# Patient Record
Sex: MALE | Race: WHITE | NOT HISPANIC OR LATINO | Employment: FULL TIME | URBAN - METROPOLITAN AREA
[De-identification: names, ages, dates, MRNs, and addresses within clinical notes are randomized per-mention and may not be internally consistent; named-entity substitution may affect disease eponyms.]

---

## 2019-06-07 ENCOUNTER — OFFICE VISIT (OUTPATIENT)
Dept: FAMILY MEDICINE | Facility: CLINIC | Age: 24
End: 2019-06-07
Payer: COMMERCIAL

## 2019-06-07 VITALS
OXYGEN SATURATION: 98 % | DIASTOLIC BLOOD PRESSURE: 68 MMHG | HEIGHT: 73 IN | TEMPERATURE: 97.7 F | SYSTOLIC BLOOD PRESSURE: 125 MMHG | BODY MASS INDEX: 25.59 KG/M2 | WEIGHT: 193.1 LBS | HEART RATE: 60 BPM

## 2019-06-07 DIAGNOSIS — R04.0 EPISTAXIS: Primary | ICD-10-CM

## 2019-06-07 ASSESSMENT — MIFFLIN-ST. JEOR: SCORE: 1919.78

## 2019-06-07 ASSESSMENT — PAIN SCALES - GENERAL: PAINLEVEL: NO PAIN (0)

## 2019-06-07 NOTE — PATIENT INSTRUCTIONS
Plan:    Nose bleeds - Daily nasal saline 2-4  sprays per nostril at least 3 times a day. Can also try sinus wash (other the counter bottle kit) daily or at least weekly. Start taking loratadine 10 mg daily (Claritin). At night before bed AYR nasal gel. All of this is available from drug stores. If you still have nosebleeds in 4 weeks then we can do blood work and refer you to ENT.     Recommend annual physical sometime before the end of the year - we can check fasting blood labs at that time too.       [facility] Medication Refill Request Information:  * Please contact your pharmacy regarding ANY request for medication refills.  ** [facility] Clinic Prescription Fax = [phone]  * Please allow 3 business days for routine medication refills.  * Please allow 5 business days for controlled substance medication refills.     [facility] Test Result notification information:  *You will be notified with in 7-10 days of your appointment day regarding the results of your test.  If you are on MyChart you will be notified as soon as the provider has reviewed the results and signed off on them.    [facility]: [phone]

## 2019-06-07 NOTE — NURSING NOTE
"24 year old  Chief Complaint   Patient presents with     Establish Care     Pt is here to establish care     Nose Bleeds     Pt is having frequent nose bleeding.        Blood pressure 125/68, pulse 60, temperature 97.7  F (36.5  C), height 1.854 m (6' 1\"), weight 87.6 kg (193 lb 1.6 oz), SpO2 98 %. Body mass index is 25.48 kg/m .  BP completed using cuff size:    There is no problem list on file for this patient.      Wt Readings from Last 2 Encounters:   06/07/19 87.6 kg (193 lb 1.6 oz)     BP Readings from Last 3 Encounters:   06/07/19 125/68       Allergies   Allergen Reactions     Seasonal Allergies      Congestion  Sneezing       No current outpatient medications on file.     No current facility-administered medications for this visit.        Social History     Tobacco Use     Smoking status: Never Smoker     Smokeless tobacco: Never Used   Substance Use Topics     Alcohol use: None     Drug use: None       Honoring Choices - Health Care Directive Guide offered to patient at time of visit.    Health Maintenance Due   Topic Date Due     PREVENTIVE CARE VISIT  1995     DTAP/TDAP/TD IMMUNIZATION (1 - Tdap) 05/28/2002     HIV SCREENING  05/28/2010     PHQ-2  01/01/2019         There is no immunization history on file for this patient.    No results found for: PAP      No lab results found.    PHQ-2 ( 1999 Pfizer) 6/7/2019   Q1: Little interest or pleasure in doing things 0   Q2: Feeling down, depressed or hopeless 0   PHQ-2 Score 0       No flowsheet data found.    No flowsheet data found.    No flowsheet data found.      Rehana Quintana, [facility]  June 7, 2019 5:32 PM  "

## 2019-06-07 NOTE — PROGRESS NOTES
"SUBJECTIVE:  Star Gonzalez is a 24 year old male presents for   Chief Complaint   Patient presents with     Establish Care     Pt is here to establish care     Nose Bleeds     Pt is having frequent nose bleeding.         HPI  Star is here today to establish care and has a concern of nose bleeds. He has suffered with them off and on his whole life. However, the past few weeks he has been getting them more frequently like 1-2 times a week. They don't last long, just a few min. He does mention runny nose off and on with allergy symptoms. No trauma to nose. Denies pain. No home treatment has been done. Denies feeling faint.     Review Of Systems  Skin: negative  Eyes: negative  Ears/Nose/Throat: as above  Respiratory: No shortness of breath, dyspnea on exertion, cough, or hemoptysis  Cardiovascular: negative      Medications and allergies were reviewed by me today.   PMH/PSH and Social History Reviewed per EMR.    Current Medications  No current outpatient medications on file.       Allergies  Allergies   Allergen Reactions     Seasonal Allergies      Congestion  Sneezing         OBJECTIVE:    Physical Exam  /68   Pulse 60   Temp 97.7  F (36.5  C)   Ht 1.854 m (6' 1\")   Wt 87.6 kg (193 lb 1.6 oz)   SpO2 98%   BMI 25.48 kg/m      Exam:  Constitutional: healthy, alert and no distress  Head: Normocephalic. No masses, lesions, tenderness or abnormalities  Neck: Neck supple. No adenopathy. Thyroid symmetric, normal size,, Carotids without bruits.  ENT: Nasal mucosa dry appearing with erythema bilat nostrils, otherwise ENT exam normal, no neck nodes or sinus tenderness  Cardiovascular: negative, PMI normal. No lifts, heaves, or thrills. RRR. No murmurs, clicks gallops or rub  Respiratory: negative. Good diaphragmatic excursion. Lungs clear  Skin: no suspicious lesions or rashes  Neurologic: Gait normal. Sensation grossly WNL.  Psychiatric: mentation appears normal and affect " normal/bright  Hematologic/Lymphatic/Immunologic: Normal cervical lymph nodes      ASSESSMENT/PLAN:  Nose bleeds - Daily nasal saline 2-4  sprays per nostril at least 3 times a day. Can also try sinus wash (other the counter bottle kit) daily or at least weekly. Start taking loratadine 10 mg daily (Claritin). At night before bed AYR nasal gel. All of this is available from drug stores. If you still have nosebleeds in 4 weeks then we can do blood work and refer you to ENT.     Recommend annual physical sometime before the end of the year - we can check fasting blood labs at that time too.       Zabrina Schmidt DNP, APRN, CNP

## 2020-01-17 DIAGNOSIS — Z11.3 SCREEN FOR STD (SEXUALLY TRANSMITTED DISEASE): Primary | ICD-10-CM

## 2020-01-18 DIAGNOSIS — Z11.3 SCREEN FOR STD (SEXUALLY TRANSMITTED DISEASE): ICD-10-CM

## 2020-01-18 LAB
HBV SURFACE AG SERPL QL IA: NONREACTIVE
HCV AB SERPL QL IA: NONREACTIVE
HIV 1+2 AB+HIV1 P24 AG SERPL QL IA: NONREACTIVE

## 2020-01-19 LAB
N GONORRHOEA DNA SPEC QL NAA+PROBE: NEGATIVE
SPECIMEN SOURCE: NORMAL
T PALLIDUM AB SER QL: NONREACTIVE

## 2020-03-11 ENCOUNTER — HEALTH MAINTENANCE LETTER (OUTPATIENT)
Age: 25
End: 2020-03-11

## 2020-05-15 DIAGNOSIS — Z20.2 POTENTIAL EXPOSURE TO STD: ICD-10-CM

## 2020-05-17 LAB
C TRACH DNA SPEC QL NAA+PROBE: NEGATIVE
N GONORRHOEA DNA SPEC QL NAA+PROBE: NEGATIVE
SPECIMEN SOURCE: NORMAL
SPECIMEN SOURCE: NORMAL

## 2021-01-03 ENCOUNTER — HEALTH MAINTENANCE LETTER (OUTPATIENT)
Age: 26
End: 2021-01-03

## 2021-01-22 ENCOUNTER — VIRTUAL VISIT (OUTPATIENT)
Dept: INTERNAL MEDICINE | Facility: CLINIC | Age: 26
End: 2021-01-22
Payer: COMMERCIAL

## 2021-01-22 ENCOUNTER — MYC MEDICAL ADVICE (OUTPATIENT)
Dept: INTERNAL MEDICINE | Facility: CLINIC | Age: 26
End: 2021-01-22

## 2021-01-22 DIAGNOSIS — Z72.51 HIGH RISK SEXUAL BEHAVIOR, UNSPECIFIED TYPE: ICD-10-CM

## 2021-01-22 DIAGNOSIS — Z79.899 ON PRE-EXPOSURE PROPHYLAXIS FOR HIV: Primary | ICD-10-CM

## 2021-01-22 DIAGNOSIS — J30.2 SEASONAL ALLERGIC RHINITIS, UNSPECIFIED TRIGGER: ICD-10-CM

## 2021-01-22 DIAGNOSIS — Z00.00 PREVENTATIVE HEALTH CARE: ICD-10-CM

## 2021-01-22 DIAGNOSIS — Z79.899 ON PRE-EXPOSURE PROPHYLAXIS FOR HIV: ICD-10-CM

## 2021-01-22 LAB
ALBUMIN SERPL-MCNC: 4.4 G/DL (ref 3.4–5)
ALP SERPL-CCNC: 35 U/L (ref 40–150)
ALT SERPL W P-5'-P-CCNC: 46 U/L (ref 0–70)
ANION GAP SERPL CALCULATED.3IONS-SCNC: 6 MMOL/L (ref 3–14)
AST SERPL W P-5'-P-CCNC: 25 U/L (ref 0–45)
BASOPHILS # BLD AUTO: 0 10E9/L (ref 0–0.2)
BASOPHILS NFR BLD AUTO: 0.3 %
BILIRUB SERPL-MCNC: 0.5 MG/DL (ref 0.2–1.3)
BUN SERPL-MCNC: 19 MG/DL (ref 7–30)
CALCIUM SERPL-MCNC: 9.3 MG/DL (ref 8.5–10.1)
CHLORIDE SERPL-SCNC: 103 MMOL/L (ref 94–109)
CO2 SERPL-SCNC: 28 MMOL/L (ref 20–32)
CREAT SERPL-MCNC: 0.94 MG/DL (ref 0.66–1.25)
DIFFERENTIAL METHOD BLD: NORMAL
EOSINOPHIL # BLD AUTO: 0.1 10E9/L (ref 0–0.7)
EOSINOPHIL NFR BLD AUTO: 0.9 %
ERYTHROCYTE [DISTWIDTH] IN BLOOD BY AUTOMATED COUNT: 11.4 % (ref 10–15)
GFR SERPL CREATININE-BSD FRML MDRD: >90 ML/MIN/{1.73_M2}
GLUCOSE SERPL-MCNC: 100 MG/DL (ref 70–99)
HBV SURFACE AB SERPL IA-ACNC: 63.11 M[IU]/ML
HBV SURFACE AG SERPL QL IA: NONREACTIVE
HCT VFR BLD AUTO: 46.6 % (ref 40–53)
HCV AB SERPL QL IA: NONREACTIVE
HGB BLD-MCNC: 16.3 G/DL (ref 13.3–17.7)
HIV 1+2 AB+HIV1 P24 AG SERPL QL IA: NONREACTIVE
IMM GRANULOCYTES # BLD: 0 10E9/L (ref 0–0.4)
IMM GRANULOCYTES NFR BLD: 0.2 %
LYMPHOCYTES # BLD AUTO: 3.5 10E9/L (ref 0.8–5.3)
LYMPHOCYTES NFR BLD AUTO: 33.3 %
MCH RBC QN AUTO: 29.4 PG (ref 26.5–33)
MCHC RBC AUTO-ENTMCNC: 35 G/DL (ref 31.5–36.5)
MCV RBC AUTO: 84 FL (ref 78–100)
MONOCYTES # BLD AUTO: 0.7 10E9/L (ref 0–1.3)
MONOCYTES NFR BLD AUTO: 6.9 %
NEUTROPHILS # BLD AUTO: 6.2 10E9/L (ref 1.6–8.3)
NEUTROPHILS NFR BLD AUTO: 58.4 %
NRBC # BLD AUTO: 0 10*3/UL
NRBC BLD AUTO-RTO: 0 /100
PLATELET # BLD AUTO: 257 10E9/L (ref 150–450)
POTASSIUM SERPL-SCNC: 3.4 MMOL/L (ref 3.4–5.3)
PROT SERPL-MCNC: 8.3 G/DL (ref 6.8–8.8)
RBC # BLD AUTO: 5.54 10E12/L (ref 4.4–5.9)
SODIUM SERPL-SCNC: 137 MMOL/L (ref 133–144)
WBC # BLD AUTO: 10.5 10E9/L (ref 4–11)

## 2021-01-22 PROCEDURE — 86780 TREPONEMA PALLIDUM: CPT | Mod: 90 | Performed by: PATHOLOGY

## 2021-01-22 PROCEDURE — 87491 CHLMYD TRACH DNA AMP PROBE: CPT | Mod: 90 | Performed by: PATHOLOGY

## 2021-01-22 PROCEDURE — 85025 COMPLETE CBC W/AUTO DIFF WBC: CPT | Performed by: PATHOLOGY

## 2021-01-22 PROCEDURE — 36415 COLL VENOUS BLD VENIPUNCTURE: CPT | Performed by: PATHOLOGY

## 2021-01-22 PROCEDURE — 87591 N.GONORRHOEAE DNA AMP PROB: CPT | Mod: 90 | Performed by: PATHOLOGY

## 2021-01-22 PROCEDURE — 86706 HEP B SURFACE ANTIBODY: CPT | Mod: 90 | Performed by: PATHOLOGY

## 2021-01-22 PROCEDURE — 87340 HEPATITIS B SURFACE AG IA: CPT | Mod: 90 | Performed by: PATHOLOGY

## 2021-01-22 PROCEDURE — 87389 HIV-1 AG W/HIV-1&-2 AB AG IA: CPT | Mod: 90 | Performed by: PATHOLOGY

## 2021-01-22 PROCEDURE — 86803 HEPATITIS C AB TEST: CPT | Mod: 90 | Performed by: PATHOLOGY

## 2021-01-22 PROCEDURE — 99203 OFFICE O/P NEW LOW 30 MIN: CPT | Mod: 95 | Performed by: PEDIATRICS

## 2021-01-22 PROCEDURE — 80053 COMPREHEN METABOLIC PANEL: CPT | Performed by: PATHOLOGY

## 2021-01-22 RX ORDER — EMTRICITABINE AND TENOFOVIR DISOPROXIL FUMARATE 200; 300 MG/1; MG/1
1 TABLET, FILM COATED ORAL DAILY
Qty: 90 TABLET | Refills: 3 | Status: SHIPPED | OUTPATIENT
Start: 2021-01-22 | End: 2021-04-21

## 2021-01-22 NOTE — NURSING NOTE
Chief Complaint   Patient presents with     Establish Care     Medication Follow-up     Video Visit Technology for this patient: Lan not working, patient has smart device, please try Doximity Video with patient     .Harriet Velez LPN at 9:28 AM on 1/22/2021.

## 2021-01-22 NOTE — PATIENT INSTRUCTIONS
If you have questions regarding Covid-19 and the Covid-19 vaccine, please visit this website.    [email or web address]

## 2021-01-22 NOTE — PROGRESS NOTES
"Dear patient. I use the medical record to document (to the best of my ability) my understanding of:   - What you told me,  - Relevant details from my exam, records review, and/or test results, and  - My assessment and plan  If you have questions, you are welcome to contact me; I will reply as soon as time allows.      Virtual Visit Details    Type of service:  Video Visit (some confusion about vehicle, ended up on Doximity via phone)    Start Time: 10:16 AM    End Time:10:34 AM    Status: new patient visit in my panel  Visit type: evaluation & management of one or more problems  Time note ((n3, 30'): The total time (on the date of service) for this service was 30 minutes, including discussion/face-to-face, chart review, interpretation not otherwise reported, documentation, and updating of the computerized record.    Originating Location (pt. Location): home    Distant Location (provider location):  [facility] [place]     Platform used for Visit: [employer]      Shanti    Star Gonzalez is a 25 year old man, with concerns including:  Chief Complaint   Patient presents with     [facility]     Medication Follow-up       History, update, and/or problems    Patient wishes to be started on PrEP (pre-exposure prophylaxis against HIV infection, for persons at risk for exposure). He is not aware of having sexual contact with HIV positive persons. The last time he was tested in May. He has only had 2 partners since then and both are on PrEP. He has had a total of 10 partners. He has never had any other STD.    He has not had any kidney problems in the past.    Counseled about safe sex and guidelines for PrEP. Will do testing and the prescription.    ADDENDUM:  I will send the following recommendations via [employer].    -----------------------------------------------------------------------------------  Recommendations for Pre-Exposure Prophylaxis (\"PrEP\")  Against HIV " infection  -----------------------------------------------------------------------------------       To help prevent HIV infection, I prescribed a daily pill of tenofovir disoproxil fumarate-emtricitabine (brand name Truvada). Fang to you, for being keeping your risk in mind.    To be on PrEP, we have to keep 3 things in mind:  -----------------------------------------------------------------  1. You are still at risk for HIV - PrEP isn't 100% effective.       - Take the pill every day. If this is difficult, please let me know (there may be other things we can do)       - Please use a condom anyway.       - We should recheck your HIV status every 3 months. If you end up getting HIV, we will need to change your treatment around.    2. Monitor for side effects, with blood tests for kidney function. We may also need to do testing for bone health.    3. Try to keep the lines of communication open with your sexual partners. And with me! It isn't always easy to bring these things up with a doctor, but I'm glad you did.     Guidelines recommend the following:  ----------------------------------------------------  - Check a blood test for kidney function 1 months after starting PrEP, and again 3 months after starting PrEP.  - Check again every 6 months. If you have any other risk for kidney problems, we may need to check every 3 months.  - Testing for HIV _every 3 months_.  - Regular testing for sexually transmitted infections (depending on you and your partners' risk).  - Check in with me every 3 months - I'm supposed to verify that you are (a) taking the pill every day, (b) reminded about using a condom, and (c) aware of ways to reduce your sexual risk. I appreciate your patience with this recommendation.     PrEP can also increase your risk for osteoporosis (under-calcified bones). Keep up your calcium intake and weight-bearing physical activity. At our regular visits, we should assess for risk of osteoporosis.    To  "facilitate the above tests, I entered a \"standing order\" for HIV and kidney testing. For lab tests, please call [phone]. Currently, our lab isn't allowing walk-ins (because of COVID and social distancing requirements).   When you get the test, please send me a message to get your refills. Remember it can take several days to turn a request around.                Seasonal allergies, sometimes may take an allergy pill if really bad.      Scheduled to get COVID vaccination this weekend.            Preventive health plans  At Mr. Gonzalez's age, I recommend the following:      - A one-time \"full\" history and physical, with creation of a complete electronic medical record. He will ask about family history prior to that.      - A return visit every 36 months to maintain the medical record and relationship. Visiting more often is welcome, if I am needed.      - Assessment of cardiovascular risk, including adult-age lipid screening (fasting). Counseling about reducing risk can be done, based on history and lipids.   The above goals are part of the \"prevention\" services that are free for many insurances.      - Fasting blood sugar. In theory this can be done every year, but men with normal weight and no family history can be screened every 2-3 years (or less often, in some cases).      - Annual blood pressure check. This can be done here, or by any competent reader.    Remember: most insurances distinguish between \"preventive\" and \"problem-based\" visits. We don't have much control over what your insurance does.  If you have a symptom or other medical concern, it may be cheaper for you to have prevention and the problems addressed in separate visits.          General comments    Review of Systems    As part of this encounter, I reviewed (and updated as appropriate) the past medical, family, and social history.      Physical exam as possible  Physical Exam         "

## 2021-01-23 PROBLEM — Z72.51 HIGH RISK SEXUAL BEHAVIOR, UNSPECIFIED TYPE: Status: ACTIVE | Noted: 2021-01-23

## 2021-01-23 PROBLEM — Z79.899 ON PRE-EXPOSURE PROPHYLAXIS FOR HIV: Status: ACTIVE | Noted: 2021-01-23

## 2021-01-23 PROBLEM — J30.2 SEASONAL ALLERGIC RHINITIS: Status: ACTIVE | Noted: 2021-01-23

## 2021-01-23 PROBLEM — Z00.00 PREVENTATIVE HEALTH CARE: Status: ACTIVE | Noted: 2021-01-23

## 2021-01-23 PROBLEM — H52.7 REFRACTIVE ERROR: Status: ACTIVE | Noted: 2021-01-23

## 2021-01-23 LAB
C TRACH DNA SPEC QL NAA+PROBE: NEGATIVE
N GONORRHOEA DNA SPEC QL NAA+PROBE: NEGATIVE
SPECIMEN SOURCE: NORMAL
SPECIMEN SOURCE: NORMAL
T PALLIDUM AB SER QL: NONREACTIVE

## 2021-01-23 NOTE — TELEPHONE ENCOUNTER
Testing  2/22/21: creatinine, vitamin D  4/22/21: creatinine, HIV  Thereafter, testing can be done every 3 months

## 2021-04-19 ENCOUNTER — TELEPHONE (OUTPATIENT)
Dept: INTERNAL MEDICINE | Facility: CLINIC | Age: 26
End: 2021-04-19

## 2021-04-19 DIAGNOSIS — Z72.51 HIGH RISK SEXUAL BEHAVIOR, UNSPECIFIED TYPE: ICD-10-CM

## 2021-04-19 DIAGNOSIS — Z79.899 ON PRE-EXPOSURE PROPHYLAXIS FOR HIV: Primary | ICD-10-CM

## 2021-04-19 NOTE — TELEPHONE ENCOUNTER
[facility] Call Center    Phone Message    May a detailed message be left on voicemail: yes , Patient is wanting to get a call back when this has been done to be able to schedule a lab apt. Please advise.     Reason for Call: Order(s): Other:     Reason for requested: Lab work for medication    Date needed: asap    Provider name: Mitch      Action Taken: Message routed to:  [facility]    Travel Screening: Not Applicable

## 2021-04-20 DIAGNOSIS — Z79.899 ON PRE-EXPOSURE PROPHYLAXIS FOR HIV: ICD-10-CM

## 2021-04-20 DIAGNOSIS — Z72.51 HIGH RISK SEXUAL BEHAVIOR, UNSPECIFIED TYPE: ICD-10-CM

## 2021-04-20 LAB
CREAT SERPL-MCNC: 0.99 MG/DL (ref 0.66–1.25)
GFR SERPL CREATININE-BSD FRML MDRD: >90 ML/MIN/{1.73_M2}

## 2021-04-20 PROCEDURE — 87389 HIV-1 AG W/HIV-1&-2 AB AG IA: CPT | Mod: 90 | Performed by: PATHOLOGY

## 2021-04-20 PROCEDURE — 82306 VITAMIN D 25 HYDROXY: CPT | Mod: 90 | Performed by: PATHOLOGY

## 2021-04-20 PROCEDURE — 36415 COLL VENOUS BLD VENIPUNCTURE: CPT | Performed by: PATHOLOGY

## 2021-04-20 PROCEDURE — 82565 ASSAY OF CREATININE: CPT | Performed by: PATHOLOGY

## 2021-04-20 NOTE — TELEPHONE ENCOUNTER
Call patient. Labs already placed. Patient schedule for today at 5:00 PM per patient.    Maribell Munson, Clinic Coordinator, April 20, 2021 at 9:23 AM

## 2021-04-21 ENCOUNTER — MYC MEDICAL ADVICE (OUTPATIENT)
Dept: INTERNAL MEDICINE | Facility: CLINIC | Age: 26
End: 2021-04-21

## 2021-04-21 DIAGNOSIS — Z79.899 ON PRE-EXPOSURE PROPHYLAXIS FOR HIV: ICD-10-CM

## 2021-04-21 LAB
DEPRECATED CALCIDIOL+CALCIFEROL SERPL-MC: 33 UG/L (ref 20–75)
HIV 1+2 AB+HIV1 P24 AG SERPL QL IA: NONREACTIVE

## 2021-04-21 RX ORDER — EMTRICITABINE AND TENOFOVIR DISOPROXIL FUMARATE 200; 300 MG/1; MG/1
1 TABLET, FILM COATED ORAL DAILY
Qty: 90 TABLET | Refills: 0 | Status: SHIPPED | OUTPATIENT
Start: 2021-04-21 | End: 2021-04-21

## 2021-04-21 RX ORDER — EMTRICITABINE AND TENOFOVIR DISOPROXIL FUMARATE 200; 300 MG/1; MG/1
1 TABLET, FILM COATED ORAL DAILY
Qty: 90 TABLET | Refills: 0 | Status: SHIPPED | OUTPATIENT
Start: 2021-04-21 | End: 2021-05-26

## 2021-04-21 NOTE — TELEPHONE ENCOUNTER
Reviewed PrEP protocol, updated:  PrEP protocol  Medication: daily Truvada  Next appt 5/11/21. Verify daily dosing, awareness of condom usage.  Next routine due: 7/22/2021 HIV, syphilis, urine G&C  Again testing: 10/22/2021 HIV, creat  Baseline creatinine 0.94 (1/22/2021), physically active

## 2021-04-21 NOTE — TELEPHONE ENCOUNTER
Pharmacy called, due to patient being on Truvada for PeEP, the patient needs to have lab work every 3 months, therefore the past Rx that was placed on 1/22/21 for 90 days and 3 refills is not valid. New rx was placed as patient did get new updated lab work and provider resulted on lab work.     Will send message to provider if he would like to have standing order for lab work for every 3 months. Orders pended this encounter. Isidra Mitchell LPN 4/21/2021 3:39 PM

## 2021-04-22 NOTE — TELEPHONE ENCOUNTER
"The HIV was already done again. but the syphilis and urine GC only need to be done every 6 months, unless the patient is said to have \"high risk behaviors\" or is symptomatic.   PrEP is a really complicated protocol.    I rewrote the Truvada in a separate encounter.    "

## 2021-04-25 ENCOUNTER — HEALTH MAINTENANCE LETTER (OUTPATIENT)
Age: 26
End: 2021-04-25

## 2021-05-26 ENCOUNTER — OFFICE VISIT (OUTPATIENT)
Dept: INTERNAL MEDICINE | Facility: CLINIC | Age: 26
End: 2021-05-26
Payer: COMMERCIAL

## 2021-05-26 VITALS
DIASTOLIC BLOOD PRESSURE: 80 MMHG | HEART RATE: 71 BPM | BODY MASS INDEX: 26.25 KG/M2 | SYSTOLIC BLOOD PRESSURE: 137 MMHG | OXYGEN SATURATION: 99 % | WEIGHT: 199 LBS

## 2021-05-26 DIAGNOSIS — Z13.1 DIABETES MELLITUS SCREENING: ICD-10-CM

## 2021-05-26 DIAGNOSIS — Z79.899 ON PRE-EXPOSURE PROPHYLAXIS FOR HIV: Primary | ICD-10-CM

## 2021-05-26 DIAGNOSIS — Z13.220 LIPID SCREENING: ICD-10-CM

## 2021-05-26 DIAGNOSIS — Z00.00 PREVENTATIVE HEALTH CARE: ICD-10-CM

## 2021-05-26 DIAGNOSIS — D22.9 MULTIPLE NEVI: ICD-10-CM

## 2021-05-26 PROCEDURE — 99395 PREV VISIT EST AGE 18-39: CPT | Performed by: PEDIATRICS

## 2021-05-26 PROCEDURE — 99212 OFFICE O/P EST SF 10 MIN: CPT | Mod: 25 | Performed by: PEDIATRICS

## 2021-05-26 RX ORDER — EMTRICITABINE AND TENOFOVIR DISOPROXIL FUMARATE 200; 300 MG/1; MG/1
1 TABLET, FILM COATED ORAL DAILY
Qty: 90 TABLET | Refills: 0 | Status: SHIPPED | OUTPATIENT
Start: 2021-07-20 | End: 2021-11-24

## 2021-05-26 ASSESSMENT — PAIN SCALES - GENERAL: PAINLEVEL: NO PAIN (0)

## 2021-05-26 NOTE — PROGRESS NOTES
"Dear patient. Thank you for visiting with me. I want you to feel respected, understood, and empowered. \"Respect\" is valuing you as much as I would a close family member. \"Empowerment\" happens when you are fully informed, and can make the best possible decision for you.  Please ask me questions!  Challenge anything that is not clear.    Medical records are primarily used as memory aids for me and my colleagues. Things to know about my documentation style:  - The 'problem list' includes current symptoms or diagnoses, and some problems that are resolved but may return. I use the past medical history for problems not expected to return.  - I use single quotation marks for things that you or I said, when I want to clarify who was speaking.  - I use double quotation marks when copying a term from elsewhere in your records. Italics (besides here) may also denote a quotation.  If you have questions or concerns, please contact me; I will reply as soon as time allows.      Star Gonzalez is a 25 year old man, with concerns including:  Chief Complaint   Patient presents with     Recheck Medication     pt here to discuss prep medication     PCP: Stevie Meyer   Visit type: dual-purpose (preventive, with additional time for evaluation & management of one or more problems)    Assessment & Plan         Preventive-oriented section:    No other/new concerns identified on exam  Reviewed preventive health counseling  Special attention given to:       -- Physical activity       -- Dietary needs including fruits, vegetables, protein sources, calcium sources, fluid       -- Regular dental visits       -- Alcohol and smoking  Immunization: up to date until 2023      Estimated body mass index is 26.25 kg/m  as calculated from the following:    Height as of 6/7/19: 1.854 m (6' 1\").    Weight as of this encounter: 90.3 kg (199 lb).      Problem-oriented Assessment & " "Plan  ------------------------------------------------------------------    Problem List as of 5/26/2021          Noted       Other    1. On pre-exposure prophylaxis for HIV - Primary 1/23/2021     Overview Addendum 5/26/2021  7:56 AM by Stevie Meyer MD      PrEP protocol  Medication: daily Truvada  Next appt 8/26/21. Verify daily dosing, awareness of condom usage.  Next routine due: 7/22/2021 HIV, syphilis, urine G&C  Again testing: 10/22/2021 HIV, creat  Baseline creatinine 0.94 (1/22/2021), physically active          Last Assessment & Plan 5/26/2021 Office Visit Edited 5/26/2021  8:26 AM by Stevie Meyer MD      Doing well, some initial nausea that resolved. Takes every day (has an alarm on his phone), no skipping. Counseled about condom use.    -----------------------------------------------------------------------------------  Recommendations for Pre-Exposure Prophylaxis (\"PrEP\")  Against HIV infection  -----------------------------------------------------------------------------------       To help prevent HIV infection, I prescribed a daily pill of tenofovir disoproxil fumarate-emtricitabine (brand name Truvada). Bobbys to you, for being keeping your risk in mind.    To be on PrEP, we have to keep 3 things in mind:  -----------------------------------------------------------------  1. You are still at risk for HIV - PrEP isn't 100% effective.       - Take the pill every day. If this is difficult, please let me know (there may be other things we can do)       - Please use a condom anyway.       - We should recheck your HIV status every 3 months. If you end up getting HIV, we will need to change your treatment around.    2. Monitor for side effects, with blood tests for kidney function. We may also need to do testing for bone health.    3. Try to keep the lines of communication open with your sexual partners. And with me! It isn't always easy to bring these things up with a doctor, but I'm " glad you did.     Guidelines recommend the following:  ----------------------------------------------------  - Check a blood test for kidney function 1 months after starting PrEP, and again 3 months after starting PrEP.  - Check again every 6 months. If you have any other risk for kidney problems, we may need to check every 3 months.  - Testing for HIV _every 3 months_.  - Regular testing for sexually transmitted infections (depending on you and your partners' risk).  - Check in with me every 3 months - I'm supposed to verify that you are (a) taking the pill every day, (b) reminded about using a condom, and (c) aware of ways to reduce your sexual risk. I appreciate your patience with this recommendation.     PrEP can also increase your risk for osteoporosis (under-calcified bones). Keep up your calcium intake and weight-bearing physical activity. At our regular visits, we should assess for risk of osteoporosis.            Relevant Medications     emtricitabine-tenofovir (TRUVADA) 200-300 MG per tablet (Start on 7/20/2021)     Other Relevant Orders     HIV Antigen Antibody Combo     Treponema Abs w Reflex to RPR and Titer     N. gonorrhea PCR - Urine, Lab Collect     C. trachomatis PCR - Urine, Lab Collect    2. 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 Multiple nevi 5/26/2021     Overview Signed 5/26/2021  8:21 AM by Stevie Meyer MD      Sun exposed upper body.  PCP recheck May 2022          Last Assessment & Plan 5/26/2021 Office Visit Written 5/26/2021  8:29 AM by Stevie Meyer MD      Has sunburn today, evidence of upper-body sun exposure in years past. Counseled about sun exposure, sunscreen.  Can have repeat PCP skin evaluation next year. Consider derm checks in 30s.                   Subjective         Healthy  Habits:    Physical activity: frequent (counseled about this)    Special diet: no    Two dental cleaning visits per year? no, (counseled about that)    Concerns for sleep apnea, excessive snoring, daytime drowsiness: no    Immunization concerns? no    Safety:    Bike helmets? yes        Problem-oriented history  ------------------------------------------------------------------      Disc PrEP (see above)        Objective     /80 (BP Location: Right arm, Patient Position: Sitting, Cuff Size: Adult Regular)   Pulse 71   Wt 90.3 kg (199 lb)   SpO2 99%   BMI 26.25 kg/m      Physical Exam  Constitutional:       Appearance: Normal appearance. He is normal weight. He is not ill-appearing.   HENT:      Head: Normocephalic and atraumatic.      Right Ear: Tympanic membrane, ear canal and external ear normal.      Left Ear: Tympanic membrane, ear canal and external ear normal.      Nose: Nose normal. No rhinorrhea.      Mouth/Throat:      Mouth: Mucous membranes are moist.      Pharynx: No posterior oropharyngeal erythema.   Eyes:      General:         Right eye: No discharge.         Left eye: No discharge.      Extraocular Movements: Extraocular movements intact.      Conjunctiva/sclera: Conjunctivae normal.   Neck:      Musculoskeletal: Normal range of motion and neck supple.      Thyroid: No thyroid mass or thyromegaly.   Cardiovascular:      Rate and Rhythm: Normal rate and regular rhythm.      Heart sounds: No murmur.   Pulmonary:      Effort: Pulmonary effort is normal. No respiratory distress.      Breath sounds: Normal breath sounds. No rales.   Abdominal:      General: Abdomen is flat. Bowel sounds are normal.      Palpations: There is no mass.      Tenderness: There is no abdominal tenderness.   Musculoskeletal: Normal range of motion.         General: No tenderness.      Right lower leg: No edema.      Left lower leg: No edema.   Lymphadenopathy:      Cervical: No cervical adenopathy.   Skin:     General:  Skin is warm.      Capillary Refill: Capillary refill takes less than 2 seconds.      Comments: Multiple nevi on upper body, similar stages  Healing sunburn shoulders and upper back   Neurological:      General: No focal deficit present.      Mental Status: He is alert.      Motor: Motor function is intact.      Coordination: Romberg sign negative. Coordination normal. Finger-Nose-Finger Test normal.      Gait: Gait is intact.      Deep Tendon Reflexes:      Reflex Scores:       Patellar reflexes are 2+ on the right side and 2+ on the left side.       Achilles reflexes are 2+ on the right side and 2+ on the left side.  Psychiatric:         Mood and Affect: Mood normal.         Behavior: Behavior normal.         Thought Content: Thought content normal.              About this visit:  Time note: The total time (on the date of service) for this service was 40 minutes, including discussion/face-to-face, chart review, interpretation not otherwise reported, documentation, and updating of the computerized record.

## 2021-05-26 NOTE — ASSESSMENT & PLAN NOTE
"Doing well, some initial nausea that resolved. Takes every day (has an alarm on his phone), no skipping. Counseled about condom use.    -----------------------------------------------------------------------------------  Recommendations for Pre-Exposure Prophylaxis (\"PrEP\")  Against HIV infection  -----------------------------------------------------------------------------------       To help prevent HIV infection, I prescribed a daily pill of tenofovir disoproxil fumarate-emtricitabine (brand name Truvada). Fang to you, for being keeping your risk in mind.    To be on PrEP, we have to keep 3 things in mind:  -----------------------------------------------------------------  1. You are still at risk for HIV - PrEP isn't 100% effective.       - Take the pill every day. If this is difficult, please let me know (there may be other things we can do)       - Please use a condom anyway.       - We should recheck your HIV status every 3 months. If you end up getting HIV, we will need to change your treatment around.    2. Monitor for side effects, with blood tests for kidney function. We may also need to do testing for bone health.    3. Try to keep the lines of communication open with your sexual partners. And with me! It isn't always easy to bring these things up with a doctor, but I'm glad you did.     Guidelines recommend the following:  ----------------------------------------------------  - Check a blood test for kidney function 1 months after starting PrEP, and again 3 months after starting PrEP.  - Check again every 6 months. If you have any other risk for kidney problems, we may need to check every 3 months.  - Testing for HIV _every 3 months_.  - Regular testing for sexually transmitted infections (depending on you and your partners' risk).  - Check in with me every 3 months - I'm supposed to verify that you are (a) taking the pill every day, (b) reminded about using a condom, and (c) aware of ways to reduce " your sexual risk. I appreciate your patience with this recommendation.     PrEP can also increase your risk for osteoporosis (under-calcified bones). Keep up your calcium intake and weight-bearing physical activity. At our regular visits, we should assess for risk of osteoporosis.

## 2021-05-26 NOTE — NURSING NOTE
Chief Complaint   Patient presents with     Recheck Medication     pt here to discuss prep medication       Odalis Blair CMA, EMT at 7:53 AM on 5/26/2021.

## 2021-05-26 NOTE — ASSESSMENT & PLAN NOTE
Has sunburn today, evidence of upper-body sun exposure in years past. Counseled about sun exposure, sunscreen.  Can have repeat PCP skin evaluation next year. Consider derm checks in 30s.

## 2021-07-30 ENCOUNTER — LAB (OUTPATIENT)
Dept: LAB | Facility: CLINIC | Age: 26
End: 2021-07-30
Payer: COMMERCIAL

## 2021-07-30 DIAGNOSIS — Z13.1 DIABETES MELLITUS SCREENING: ICD-10-CM

## 2021-07-30 DIAGNOSIS — Z13.220 LIPID SCREENING: ICD-10-CM

## 2021-07-30 DIAGNOSIS — Z79.899 ON PRE-EXPOSURE PROPHYLAXIS FOR HIV: ICD-10-CM

## 2021-07-30 LAB
CHOLEST SERPL-MCNC: 152 MG/DL
FASTING STATUS PATIENT QL REPORTED: YES
FASTING STATUS PATIENT QL REPORTED: YES
GLUCOSE BLD-MCNC: 93 MG/DL (ref 70–99)
HDLC SERPL-MCNC: 46 MG/DL
LDLC SERPL CALC-MCNC: 91 MG/DL
NONHDLC SERPL-MCNC: 106 MG/DL
T PALLIDUM AB SER QL: NONREACTIVE
TRIGL SERPL-MCNC: 74 MG/DL

## 2021-07-30 PROCEDURE — 87491 CHLMYD TRACH DNA AMP PROBE: CPT | Mod: 90 | Performed by: PATHOLOGY

## 2021-07-30 PROCEDURE — 82947 ASSAY GLUCOSE BLOOD QUANT: CPT | Performed by: PATHOLOGY

## 2021-07-30 PROCEDURE — 86780 TREPONEMA PALLIDUM: CPT | Mod: 90 | Performed by: PATHOLOGY

## 2021-07-30 PROCEDURE — 87591 N.GONORRHOEAE DNA AMP PROB: CPT | Mod: 90 | Performed by: PATHOLOGY

## 2021-07-30 PROCEDURE — 87389 HIV-1 AG W/HIV-1&-2 AB AG IA: CPT | Mod: 90 | Performed by: PATHOLOGY

## 2021-07-30 PROCEDURE — 36415 COLL VENOUS BLD VENIPUNCTURE: CPT | Performed by: PATHOLOGY

## 2021-07-30 PROCEDURE — 80061 LIPID PANEL: CPT | Performed by: PATHOLOGY

## 2021-07-31 LAB
C TRACH DNA SPEC QL NAA+PROBE: NEGATIVE
N GONORRHOEA DNA SPEC QL NAA+PROBE: NEGATIVE

## 2021-08-01 LAB — HIV 1+2 AB+HIV1 P24 AG SERPL QL IA: NONREACTIVE

## 2021-10-10 ENCOUNTER — HEALTH MAINTENANCE LETTER (OUTPATIENT)
Age: 26
End: 2021-10-10

## 2021-11-23 ENCOUNTER — LAB (OUTPATIENT)
Dept: LAB | Facility: CLINIC | Age: 26
End: 2021-11-23
Attending: PEDIATRICS
Payer: COMMERCIAL

## 2021-11-23 DIAGNOSIS — Z79.899 ON PRE-EXPOSURE PROPHYLAXIS FOR HIV: ICD-10-CM

## 2021-11-23 LAB
CREAT SERPL-MCNC: 0.92 MG/DL (ref 0.66–1.25)
GFR SERPL CREATININE-BSD FRML MDRD: >90 ML/MIN/1.73M2

## 2021-11-23 PROCEDURE — 87491 CHLMYD TRACH DNA AMP PROBE: CPT | Mod: 90 | Performed by: PATHOLOGY

## 2021-11-23 PROCEDURE — 86780 TREPONEMA PALLIDUM: CPT | Mod: 90 | Performed by: PATHOLOGY

## 2021-11-23 PROCEDURE — 36415 COLL VENOUS BLD VENIPUNCTURE: CPT | Performed by: PATHOLOGY

## 2021-11-23 PROCEDURE — 82565 ASSAY OF CREATININE: CPT | Performed by: PATHOLOGY

## 2021-11-23 PROCEDURE — 99000 SPECIMEN HANDLING OFFICE-LAB: CPT | Performed by: PATHOLOGY

## 2021-11-23 PROCEDURE — 87591 N.GONORRHOEAE DNA AMP PROB: CPT | Mod: 90 | Performed by: PATHOLOGY

## 2021-11-23 PROCEDURE — 87389 HIV-1 AG W/HIV-1&-2 AB AG IA: CPT | Mod: 90 | Performed by: PATHOLOGY

## 2021-11-24 LAB
C TRACH DNA SPEC QL NAA+PROBE: NEGATIVE
HIV 1+2 AB+HIV1 P24 AG SERPL QL IA: NONREACTIVE
N GONORRHOEA DNA SPEC QL NAA+PROBE: NEGATIVE
T PALLIDUM AB SER QL: NONREACTIVE

## 2021-12-10 ENCOUNTER — VIRTUAL VISIT (OUTPATIENT)
Dept: INTERNAL MEDICINE | Facility: CLINIC | Age: 26
End: 2021-12-10
Payer: COMMERCIAL

## 2021-12-10 DIAGNOSIS — Z79.899 ON PRE-EXPOSURE PROPHYLAXIS FOR HIV: Primary | ICD-10-CM

## 2021-12-10 PROCEDURE — 99213 OFFICE O/P EST LOW 20 MIN: CPT | Mod: 95 | Performed by: PEDIATRICS

## 2021-12-10 NOTE — NURSING NOTE
Chief Complaint   Patient presents with     Recheck Medication     Patient calls in for a medication follow up.         Hubert Sadners MA on 12/10/2021 at 7:14 AM

## 2021-12-10 NOTE — ASSESSMENT & PLAN NOTE
Updating above.  Reviewed labs November ok.  Was in a long-term committed relationship for a while, and was able to stop taking the medicine.  Counseled about safety and parameters for taking. Not known about how long needs to be taken for optimal effect, so if question of other contacts should take well in advance and afterwards.

## 2022-03-11 ENCOUNTER — VIRTUAL VISIT (OUTPATIENT)
Dept: INTERNAL MEDICINE | Facility: CLINIC | Age: 27
End: 2022-03-11
Payer: COMMERCIAL

## 2022-03-11 DIAGNOSIS — Z79.899 ON PRE-EXPOSURE PROPHYLAXIS FOR HIV: ICD-10-CM

## 2022-03-11 PROCEDURE — 99213 OFFICE O/P EST LOW 20 MIN: CPT | Mod: 95 | Performed by: PEDIATRICS

## 2022-03-11 RX ORDER — EMTRICITABINE AND TENOFOVIR DISOPROXIL FUMARATE 200; 300 MG/1; MG/1
1 TABLET, FILM COATED ORAL DAILY
Qty: 90 TABLET | Refills: 0 | Status: SHIPPED | OUTPATIENT
Start: 2022-03-11

## 2022-03-11 NOTE — PROGRESS NOTES
"Dear patient. Thank you for visiting with me. I want you to feel respected, understood, and empowered. \"Respect\" is valuing you as much as I would a close family member. \"Empowerment\" happens when you are fully informed, and can make the best possible decision for you.  Please ask me questions!  Challenge anything that is not clear.    Medical records are primarily used as memory aids for me and my colleagues. Things to know about my documentation style:  - The 'problem list' includes current symptoms or diagnoses, and some problems that are resolved but may return. I use the past medical history for problems not expected to return.  - I use single quotation marks for things that you or I said, when I want to clarify who was speaking.  - I use double quotation marks when copying a term from elsewhere in your records. Italics (besides here) may also denote a quotation.  If you have questions or concerns, please contact me; I will reply as soon as time allows.      Virtual Visit Details    Start Time: 4:45 PM    Type of service:  Video Visit    End Time:4:52 PM    Originating Location (pt. Location): Home    Platform used for Visit: Visionnaire    Distant Location (provider location):  [facility] [place]     PCP: Stevie Meyer  Visit type: problem-oriented        Context    Star Gonzalez is a 26 year old man, with concerns including:  Chief Complaint   Patient presents with     Refill Request       History, update, and/or problems            VISIT FREQ: every 3 months by video  Outstanding issues (Dr. Meyer)  --------------------------------------------  -- PrEP protocol    General comments          Physical exam as possible  Physical Exam               "

## 2022-03-21 ENCOUNTER — LAB (OUTPATIENT)
Dept: LAB | Facility: CLINIC | Age: 27
End: 2022-03-21
Payer: COMMERCIAL

## 2022-03-21 DIAGNOSIS — Z79.899 ON PRE-EXPOSURE PROPHYLAXIS FOR HIV: ICD-10-CM

## 2022-03-21 PROCEDURE — 87491 CHLMYD TRACH DNA AMP PROBE: CPT

## 2022-03-21 PROCEDURE — 36415 COLL VENOUS BLD VENIPUNCTURE: CPT

## 2022-03-21 PROCEDURE — 87591 N.GONORRHOEAE DNA AMP PROB: CPT

## 2022-03-21 PROCEDURE — 82565 ASSAY OF CREATININE: CPT

## 2022-03-21 PROCEDURE — 87389 HIV-1 AG W/HIV-1&-2 AB AG IA: CPT

## 2022-03-21 PROCEDURE — 86780 TREPONEMA PALLIDUM: CPT

## 2022-03-22 LAB
CREAT SERPL-MCNC: 0.96 MG/DL (ref 0.66–1.25)
GFR SERPL CREATININE-BSD FRML MDRD: >90 ML/MIN/1.73M2
HIV 1+2 AB+HIV1 P24 AG SERPL QL IA: NONREACTIVE
T PALLIDUM AB SER QL: NONREACTIVE

## 2022-03-23 LAB
C TRACH DNA SPEC QL NAA+PROBE: NEGATIVE
N GONORRHOEA DNA SPEC QL NAA+PROBE: NEGATIVE

## 2022-07-16 ENCOUNTER — HEALTH MAINTENANCE LETTER (OUTPATIENT)
Age: 27
End: 2022-07-16

## 2022-09-18 ENCOUNTER — HEALTH MAINTENANCE LETTER (OUTPATIENT)
Age: 27
End: 2022-09-18

## 2023-07-30 ENCOUNTER — HEALTH MAINTENANCE LETTER (OUTPATIENT)
Age: 28
End: 2023-07-30

## 2024-01-29 ENCOUNTER — APPOINTMENT (OUTPATIENT)
Dept: URBAN - METROPOLITAN AREA CLINIC 257 | Age: 29
Setting detail: DERMATOLOGY
End: 2024-01-30

## 2024-01-29 DIAGNOSIS — L81.4 OTHER MELANIN HYPERPIGMENTATION: ICD-10-CM

## 2024-01-29 DIAGNOSIS — Z71.89 OTHER SPECIFIED COUNSELING: ICD-10-CM

## 2024-01-29 DIAGNOSIS — D49.2 NEOPLASM OF UNSPECIFIED BEHAVIOR OF BONE, SOFT TISSUE, AND SKIN: ICD-10-CM

## 2024-01-29 DIAGNOSIS — D22 MELANOCYTIC NEVI: ICD-10-CM

## 2024-01-29 DIAGNOSIS — L57.8 OTHER SKIN CHANGES DUE TO CHRONIC EXPOSURE TO NONIONIZING RADIATION: ICD-10-CM

## 2024-01-29 PROBLEM — D22.5 MELANOCYTIC NEVI OF TRUNK: Status: ACTIVE | Noted: 2024-01-29

## 2024-01-29 PROCEDURE — 11102 TANGNTL BX SKIN SINGLE LES: CPT

## 2024-01-29 PROCEDURE — OTHER BIOPSY BY SHAVE METHOD: OTHER

## 2024-01-29 PROCEDURE — OTHER COUNSELING: OTHER

## 2024-01-29 PROCEDURE — 99203 OFFICE O/P NEW LOW 30 MIN: CPT | Mod: 25

## 2024-01-29 PROCEDURE — OTHER MIPS QUALITY: OTHER

## 2024-01-29 PROCEDURE — 11103 TANGNTL BX SKIN EA SEP/ADDL: CPT

## 2024-01-29 ASSESSMENT — LOCATION DETAILED DESCRIPTION DERM
LOCATION DETAILED: PERIUMBILICAL SKIN
LOCATION DETAILED: LEFT MEDIAL UPPER BACK
LOCATION DETAILED: LEFT INFERIOR MEDIAL MIDBACK
LOCATION DETAILED: LEFT SUPERIOR MEDIAL UPPER BACK

## 2024-01-29 ASSESSMENT — LOCATION SIMPLE DESCRIPTION DERM
LOCATION SIMPLE: LEFT LOWER BACK
LOCATION SIMPLE: ABDOMEN
LOCATION SIMPLE: LEFT UPPER BACK

## 2024-01-29 ASSESSMENT — LOCATION ZONE DERM: LOCATION ZONE: TRUNK

## 2024-01-29 NOTE — PROCEDURE: BIOPSY BY SHAVE METHOD
Detail Level: Detailed
Depth Of Biopsy: dermis
Was A Bandage Applied: Yes
Size Of Lesion In Cm: 0.6
X Size Of Lesion In Cm: 0
Biopsy Type: H and E
Biopsy Method: double edge Personna blade
Anesthesia Type: 1% lidocaine with epinephrine
Anesthesia Volume In Cc: 0.5
Hemostasis: Drysol
Wound Care: Petrolatum
Dressing: bandage
Destruction After The Procedure: No
Type Of Destruction Used: Curettage
Curettage Text: The wound bed was treated with curettage after the biopsy was performed.
Cryotherapy Text: The wound bed was treated with cryotherapy after the biopsy was performed.
Electrodesiccation Text: The wound bed was treated with electrodesiccation after the biopsy was performed.
Electrodesiccation And Curettage Text: The wound bed was treated with electrodesiccation and curettage after the biopsy was performed.
Silver Nitrate Text: The wound bed was treated with silver nitrate after the biopsy was performed.
Lab: [id number]
Consent: Written consent was obtained and risks were reviewed including but not limited to scarring, infection, bleeding, scabbing, incomplete removal, nerve damage and allergy to anesthesia.
Post-Care Instructions: I reviewed with the patient in detail post-care instructions. Patient is to keep the biopsy site dry overnight, and then apply bacitracin twice daily until healed. Patient may apply hydrogen peroxide soaks to remove any crusting.
Notification Instructions: Patient will be notified of biopsy results. However, patient instructed to call the office if not contacted within 2 weeks.
Billing Type: Third-Party Bill
Information: Selecting Yes will display possible errors in your note based on the variables you have selected. This validation is only offered as a suggestion for you. PLEASE NOTE THAT THE VALIDATION TEXT WILL BE REMOVED WHEN YOU FINALIZE YOUR NOTE. IF YOU WANT TO FAX A PRELIMINARY NOTE YOU WILL NEED TO TOGGLE THIS TO 'NO' IF YOU DO NOT WANT IT IN YOUR FAXED NOTE.

## 2024-01-29 NOTE — HPI: FULL BODY SKIN EXAMINATION
What Type Of Note Output Would You Prefer (Optional)?: Bullet Format
What Is The Reason For Today's Visit?: Full Body Skin Examination
What Is The Reason For Today's Visit? (Being Monitored For X): concerning skin lesions on an annual basis
Additional History: Patient has various moles on both arms he would like further evaluated.

## 2024-09-22 ENCOUNTER — HEALTH MAINTENANCE LETTER (OUTPATIENT)
Age: 29
End: 2024-09-22